# Patient Record
Sex: FEMALE | Race: OTHER | Employment: OTHER | URBAN - METROPOLITAN AREA
[De-identification: names, ages, dates, MRNs, and addresses within clinical notes are randomized per-mention and may not be internally consistent; named-entity substitution may affect disease eponyms.]

---

## 2021-07-09 ENCOUNTER — NEW PATIENT COMPREHENSIVE (OUTPATIENT)
Dept: URBAN - METROPOLITAN AREA CLINIC 43 | Facility: CLINIC | Age: 61
End: 2021-07-09

## 2021-07-09 DIAGNOSIS — Z46.0: ICD-10-CM

## 2021-07-09 DIAGNOSIS — H43.812: ICD-10-CM

## 2021-07-09 DIAGNOSIS — H25.9: ICD-10-CM

## 2021-07-09 PROCEDURE — 92004 COMPRE OPH EXAM NEW PT 1/>: CPT

## 2021-07-09 PROCEDURE — 92015 DETERMINE REFRACTIVE STATE: CPT

## 2021-07-09 PROCEDURE — 92310-1 LEVEL 1 CONTACT LENS MANAGEMENT

## 2021-07-09 ASSESSMENT — VISUAL ACUITY
OS_SC: 20/200-1
OD_SC: J1
OD_SC: 20/80+1
OS_SC: J1
OS_CC: 20/30-2
OD_CC: 20/30

## 2021-07-09 ASSESSMENT — TONOMETRY
OS_IOP_MMHG: 10
OD_IOP_MMHG: 10

## 2024-03-27 ENCOUNTER — EMERGENCY VISIT (OUTPATIENT)
Dept: URBAN - METROPOLITAN AREA CLINIC 43 | Facility: CLINIC | Age: 64
End: 2024-03-27

## 2024-03-27 DIAGNOSIS — H11.32: ICD-10-CM

## 2024-03-27 PROCEDURE — 92012 INTRM OPH EXAM EST PATIENT: CPT

## 2024-03-27 ASSESSMENT — VISUAL ACUITY
OD_CC: 20/30 GLASSES
OS_CC: 20/30+2

## 2024-04-24 ENCOUNTER — COMPREHENSIVE EXAM (OUTPATIENT)
Dept: URBAN - METROPOLITAN AREA CLINIC 43 | Facility: CLINIC | Age: 64
End: 2024-04-24

## 2024-04-24 DIAGNOSIS — H25.9: ICD-10-CM

## 2024-04-24 DIAGNOSIS — H52.13: ICD-10-CM

## 2024-04-24 DIAGNOSIS — H52.202: ICD-10-CM

## 2024-04-24 DIAGNOSIS — Z46.0: ICD-10-CM

## 2024-04-24 DIAGNOSIS — H43.812: ICD-10-CM

## 2024-04-24 PROCEDURE — 92014 COMPRE OPH EXAM EST PT 1/>: CPT

## 2024-04-24 PROCEDURE — 92250E RETINAL SCREENING, ELECTIVE

## 2024-04-24 PROCEDURE — 92310-1 LEVEL 1 CONTACT LENS MANAGEMENT

## 2024-04-24 PROCEDURE — 92015 DETERMINE REFRACTIVE STATE: CPT

## 2024-04-24 ASSESSMENT — VISUAL ACUITY
OS_SC: 20/200
OD_CC: 20/20-2
OD_SC: J1
OS_SC: J1
OD_SC: 20/80
OS_CC: 20/25-1

## 2024-04-24 ASSESSMENT — TONOMETRY
OS_IOP_PATIENTDEFERRED: 12
OD_IOP_PATIENTDEFERRED: 12

## 2024-08-27 ENCOUNTER — APPOINTMENT (RX ONLY)
Dept: URBAN - METROPOLITAN AREA CLINIC 137 | Facility: CLINIC | Age: 64
Setting detail: DERMATOLOGY
End: 2024-08-27

## 2024-08-27 DIAGNOSIS — I87.2 VENOUS INSUFFICIENCY (CHRONIC) (PERIPHERAL): ICD-10-CM

## 2024-08-27 DIAGNOSIS — I83.81 VARICOSE VEINS OF LOWER EXTREMITIES WITH PAIN: ICD-10-CM | Status: WORSENING

## 2024-08-27 PROBLEM — I83.813 VARICOSE VEINS OF BILATERAL LOWER EXTREMITIES WITH PAIN: Status: ACTIVE | Noted: 2024-08-27

## 2024-08-27 PROCEDURE — ? CEAP CLASSIFICATION

## 2024-08-27 PROCEDURE — ? VENOUS CLINICAL SEVERITY SCORE

## 2024-08-27 PROCEDURE — 99214 OFFICE O/P EST MOD 30 MIN: CPT

## 2024-08-27 PROCEDURE — ? RECOMMENDATIONS

## 2024-08-27 PROCEDURE — ? MEDICAL CONSULTATION: VENOUS DISEASE

## 2024-08-27 ASSESSMENT — LOCATION DETAILED DESCRIPTION DERM
LOCATION DETAILED: RIGHT DISTAL PRETIBIAL REGION
LOCATION DETAILED: LEFT PROXIMAL PRETIBIAL REGION

## 2024-08-27 ASSESSMENT — LOCATION SIMPLE DESCRIPTION DERM
LOCATION SIMPLE: RIGHT PRETIBIAL REGION
LOCATION SIMPLE: LEFT PRETIBIAL REGION

## 2024-08-27 ASSESSMENT — LOCATION ZONE DERM: LOCATION ZONE: LEG

## 2024-08-27 NOTE — PROCEDURE: MEDICAL CONSULTATION: VENOUS DISEASE
Right Leg Ulcer Diameter: 0- None
Right Leg Compression Therapy: 0- None or noncompliant
Right Dorsalis Pedis Pulse: 2 (Easily palpable)
Include Left Vcss In Note: Yes
Right Leg Venous Hyperpigmentation: 0- None or focal low intensity (tan)
Right Leg Circumference: medium
Right Leg: Peripheral Vascular Disease?: No
Detail Level: Zone

## 2024-08-27 NOTE — HPI: VEIN EVALUATION
Do You Have A Family History Of Vein Disease?: no
Which Leg Is Worse?: Equally Affected
How Severe Is/Are Your Symptoms?: moderate
Is This A New Presentation, Or A Follow-Up?: Vein Evaluation

## 2024-08-27 NOTE — PROCEDURE: VENOUS CLINICAL SEVERITY SCORE
Left Leg Induration: 0- None
Include Left Vcss In Note: Yes
Right Leg Varicose Veins: 1- Few, scattered: branch varicose veins
Left Leg Pain: 1- Occasional, not restricting activity or requiring analgesics
Right Leg Pigmentation: 0- None or focal low intensity (tan)
Left Leg Venous Edema: 1- Evening ankle edema only
Left Leg Compression Therapy: 0- None or noncompliant
Detail Level: Simple

## 2024-08-27 NOTE — PROCEDURE: RECOMMENDATIONS
Render Risk Assessment In Note?: yes
Patient Management Risk Assessment: Moderate
Recommendation Preamble: The following recommendations were made during the visit:
Detail Level: Zone
Recommendations (Free Text): The patient will be scheduled for a bilateral lower extremity venous ultrasound in the coming weeks to determine the extent of venous insufficiency. Afterwards, the patient will schedule a follow up visit with the provider to review their results. Going forward, they are to begin/continue with conservative management and follow up in the clinic to reevaluate symptoms and determine future management.

## 2024-09-12 ENCOUNTER — APPOINTMENT (RX ONLY)
Dept: URBAN - METROPOLITAN AREA CLINIC 135 | Facility: CLINIC | Age: 64
Setting detail: DERMATOLOGY
End: 2024-09-12

## 2024-09-12 DIAGNOSIS — I87.2 VENOUS INSUFFICIENCY (CHRONIC) (PERIPHERAL): ICD-10-CM

## 2024-09-12 PROCEDURE — ? DOPPLER US

## 2024-09-12 NOTE — PROCEDURE: DOPPLER US
See Attached Documentation Text: Please refer to the attached ultrasound documentation for complete details of the procedure and the venous findings.
Left Mid Gsv Reflux (Sec): no reflux
Use - 'see Attached Documentation' Verbiage?: Yes - Will Include Text Below and Will Remove Other Portions of the Note
Detail Level: Detailed
Use Ssv Or Lsv: LSV

## 2024-09-12 NOTE — HPI: VEIN EVALUATION
Is This A New Presentation, Or A Follow-Up?: Vein Evaluation
Additional History: patient presents for her initial ultrasound